# Patient Record
Sex: MALE | Race: WHITE | URBAN - METROPOLITAN AREA
[De-identification: names, ages, dates, MRNs, and addresses within clinical notes are randomized per-mention and may not be internally consistent; named-entity substitution may affect disease eponyms.]

---

## 2017-03-29 PROBLEM — S62.616A CLOSED DISPLACED FRACTURE OF PROXIMAL PHALANX OF RIGHT LITTLE FINGER, INITIAL ENCOUNTER: Status: ACTIVE | Noted: 2017-03-29

## 2017-04-25 PROBLEM — M79.641 PAIN OF RIGHT HAND: Status: ACTIVE | Noted: 2017-04-25

## 2017-05-10 PROBLEM — S62.616D CLOSED DISPLACED FRACTURE OF PROXIMAL PHALANX OF RIGHT LITTLE FINGER WITH ROUTINE HEALING, SUBSEQUENT ENCOUNTER: Status: ACTIVE | Noted: 2017-05-10

## 2017-07-20 PROCEDURE — 36415 COLL VENOUS BLD VENIPUNCTURE: CPT | Performed by: UROLOGY

## 2017-07-20 PROCEDURE — 84153 ASSAY OF PSA TOTAL: CPT | Performed by: UROLOGY

## 2018-02-27 PROBLEM — S62.616D CLOSED DISPLACED FRACTURE OF PROXIMAL PHALANX OF RIGHT LITTLE FINGER WITH ROUTINE HEALING, SUBSEQUENT ENCOUNTER: Status: RESOLVED | Noted: 2017-05-10 | Resolved: 2018-02-27

## 2018-02-27 PROBLEM — S62.616A CLOSED DISPLACED FRACTURE OF PROXIMAL PHALANX OF RIGHT LITTLE FINGER, INITIAL ENCOUNTER: Status: RESOLVED | Noted: 2017-03-29 | Resolved: 2018-02-27

## 2021-01-21 ENCOUNTER — OFFICE VISIT (OUTPATIENT)
Dept: URBAN - METROPOLITAN AREA CLINIC 27 | Facility: CLINIC | Age: 86
End: 2021-01-21
Payer: MEDICARE

## 2021-01-21 DIAGNOSIS — H40.9 UNSPECIFIED GLAUCOMA: ICD-10-CM

## 2021-01-21 DIAGNOSIS — H34.8122 CENTRAL RETINAL VEIN OCCLUSION, LEFT EYE, STABLE: Primary | ICD-10-CM

## 2021-01-21 PROCEDURE — 99204 OFFICE O/P NEW MOD 45 MIN: CPT | Performed by: OPHTHALMOLOGY

## 2021-01-21 PROCEDURE — 92134 CPTRZ OPH DX IMG PST SGM RTA: CPT | Performed by: OPHTHALMOLOGY

## 2021-01-21 ASSESSMENT — INTRAOCULAR PRESSURE
OD: 15
OS: 14

## 2021-01-21 NOTE — IMPRESSION/PLAN
Impression: Age-related nuclear cataract, bilateral: H25.13. Bilateral. Plan: --still with excellent vision
--followed by Dr. Maria Teresa Miranda

## 2021-01-21 NOTE — IMPRESSION/PLAN
Impression: Unspecified glaucoma: H40.9.  Bilateral. Plan: --cont topical therapy as directed by Dr. Lorenzo Barragan

## 2021-01-21 NOTE — IMPRESSION/PLAN
Impression: Central retinal vein occlusion, left eye, stable: K56.3543. Left. Plan: --exam/OCT reveal no e/o CME or NV OS
--findings/diagnosis d/w pt in detail --tx not indicated at this time --pt to see PCP for BP/lipid monitoring --pt instructed to call immediately with s/s decreased vision/pain RTC 3 months for OCT OU re-eval

## 2021-01-21 NOTE — IMPRESSION/PLAN
Impression: Vitreous degeneration, bilateral: H43.813.  Bilateral. Plan: --stable OU
--RDW discussed

## 2021-03-05 ENCOUNTER — OFFICE VISIT (OUTPATIENT)
Dept: URBAN - METROPOLITAN AREA CLINIC 7 | Facility: CLINIC | Age: 86
End: 2021-03-05
Payer: MEDICARE

## 2021-03-05 PROCEDURE — 99214 OFFICE O/P EST MOD 30 MIN: CPT | Performed by: OPHTHALMOLOGY

## 2021-03-05 PROCEDURE — 67028 INJECTION EYE DRUG: CPT | Performed by: OPHTHALMOLOGY

## 2021-03-05 PROCEDURE — 92134 CPTRZ OPH DX IMG PST SGM RTA: CPT | Performed by: OPHTHALMOLOGY

## 2021-03-05 ASSESSMENT — INTRAOCULAR PRESSURE
OS: 14
OD: 14

## 2021-03-05 NOTE — IMPRESSION/PLAN
Impression: Unspecified glaucoma: H40.9.  Bilateral. Plan: --cont topical therapy as directed by Dr. Adhikari All

## 2021-03-05 NOTE — IMPRESSION/PLAN
Impression: Central retinal vein occlusion of left eye w/ macular edema: H34.8120 Left. Plan: --exam/OCT reveal new-onset, severe CME d/t CRVO--findings d/w pt, rec initiation of anti-VEGF tx to maintain vision --pt understands tx may not improve VA but should reduce the risk of further visual loss --r/b/a of Avastin for CRVO discussed, pt understands Avastin is considered off-label --pt elects to initiate tx with Avastin, injection #1 performed successfully w/o complication
--will plan series of 3 consecutive monthly injections to quickly stabilize condition --pt instructed to call with immediately with s/s VA loss/pain
--control BP/lipids RTC 1 month for Avastin OS #2/3

## 2021-03-05 NOTE — IMPRESSION/PLAN
Impression: Age-related nuclear cataract, bilateral: H25.13. Bilateral. Plan: --followed by Dr. Derek Lopes

## 2021-04-15 ENCOUNTER — PROCEDURE (OUTPATIENT)
Dept: URBAN - METROPOLITAN AREA CLINIC 27 | Facility: CLINIC | Age: 86
End: 2021-04-15
Payer: MEDICARE

## 2021-04-15 PROCEDURE — 67028 INJECTION EYE DRUG: CPT | Performed by: OPHTHALMOLOGY

## 2021-04-15 ASSESSMENT — INTRAOCULAR PRESSURE
OS: 16
OD: 17

## 2021-05-20 ENCOUNTER — PROCEDURE (OUTPATIENT)
Dept: URBAN - METROPOLITAN AREA CLINIC 27 | Facility: CLINIC | Age: 86
End: 2021-05-20
Payer: MEDICARE

## 2021-05-20 DIAGNOSIS — H34.8120 CENTRAL RETINAL VEIN OCCLUSION, LEFT EYE, WITH MACULAR EDEMA: Primary | ICD-10-CM

## 2021-05-20 PROCEDURE — 67028 INJECTION EYE DRUG: CPT | Performed by: OPHTHALMOLOGY

## 2021-05-20 ASSESSMENT — INTRAOCULAR PRESSURE
OD: 13
OS: 13

## 2021-06-17 ENCOUNTER — OFFICE VISIT (OUTPATIENT)
Dept: URBAN - METROPOLITAN AREA CLINIC 27 | Facility: CLINIC | Age: 86
End: 2021-06-17
Payer: MEDICARE

## 2021-06-17 PROCEDURE — 99213 OFFICE O/P EST LOW 20 MIN: CPT | Performed by: OPHTHALMOLOGY

## 2021-06-17 PROCEDURE — 92134 CPTRZ OPH DX IMG PST SGM RTA: CPT | Performed by: OPHTHALMOLOGY

## 2021-06-17 PROCEDURE — 67028 INJECTION EYE DRUG: CPT | Performed by: OPHTHALMOLOGY

## 2021-06-17 ASSESSMENT — INTRAOCULAR PRESSURE
OS: 12
OD: 13

## 2021-06-17 NOTE — IMPRESSION/PLAN
Impression: Unspecified glaucoma: H40.9.  Bilateral. Plan: --cont topical therapy as directed by Dr. Ryan Layton

## 2021-06-17 NOTE — IMPRESSION/PLAN
Impression: Central retinal vein occlusion of left eye w/ macular edema: H34.8120 Left. s/p Avastin x 3, last 5/20/21 Plan: --exam/OCT reveal improving CME d/t CRVO, s/p Avastin x 3
--recommend continuing anti-VEGF with gradual extension of interval
--r/b/a of Avastin for CRVO discussed --pt understands Avastin is considered off-label --pt elects to continue tx with Avastin
--injection #4 performed successfully w/o complication --pt instructed to call with immediately with s/s VA loss/pain
--control BP/lipids RTC 8 weeks OCT OU reeval Avastin

## 2021-06-17 NOTE — IMPRESSION/PLAN
Impression: Age-related nuclear cataract, bilateral: H25.13. Bilateral. Plan: --followed by Dr. Rafia Hamilton

## 2021-09-21 ENCOUNTER — OFFICE VISIT (OUTPATIENT)
Dept: URBAN - METROPOLITAN AREA CLINIC 27 | Facility: CLINIC | Age: 86
End: 2021-09-21
Payer: MEDICARE

## 2021-09-21 DIAGNOSIS — H43.813 VITREOUS DEGENERATION, BILATERAL: ICD-10-CM

## 2021-09-21 DIAGNOSIS — H25.13 AGE-RELATED NUCLEAR CATARACT, BILATERAL: ICD-10-CM

## 2021-09-21 PROCEDURE — 67028 INJECTION EYE DRUG: CPT | Performed by: OPHTHALMOLOGY

## 2021-09-21 PROCEDURE — 92134 CPTRZ OPH DX IMG PST SGM RTA: CPT | Performed by: OPHTHALMOLOGY

## 2021-09-21 PROCEDURE — 99214 OFFICE O/P EST MOD 30 MIN: CPT | Performed by: OPHTHALMOLOGY

## 2021-09-21 ASSESSMENT — INTRAOCULAR PRESSURE
OD: 13
OS: 11

## 2021-09-21 NOTE — IMPRESSION/PLAN
Impression: Central retinal vein occlusion of left eye w/ macular edema: H34.8120 Left. 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
--exam/OCT reveal recurrent CME d/t CRVO
--recommend restarting anti-VEGF with greater frequency
--r/b/a of Avastin for CRVO discussed --pt understands Avastin is considered off-label --pt elects to continue tx with Avastin
--injection #5 performed successfully w/o complication --pt instructed to call with immediately with s/s VA loss/pain
--control BP/lipids RTC 6 weeks for OCT OU re-eval

## 2021-09-21 NOTE — IMPRESSION/PLAN
Impression: Age-related nuclear cataract, bilateral: H25.13. Bilateral. Plan: --followed by Dr. Fabian Loera

## 2021-09-21 NOTE — IMPRESSION/PLAN
Impression: Unspecified glaucoma: H40.9.  Bilateral. Plan: --cont topical therapy as directed by Dr. Radha Beltran

## 2021-11-02 ENCOUNTER — OFFICE VISIT (OUTPATIENT)
Dept: URBAN - METROPOLITAN AREA CLINIC 27 | Facility: CLINIC | Age: 86
End: 2021-11-02
Payer: MEDICARE

## 2021-11-02 PROCEDURE — 92134 CPTRZ OPH DX IMG PST SGM RTA: CPT | Performed by: OPHTHALMOLOGY

## 2021-11-02 PROCEDURE — 67028 INJECTION EYE DRUG: CPT | Performed by: OPHTHALMOLOGY

## 2021-11-02 ASSESSMENT — INTRAOCULAR PRESSURE
OD: 15
OS: 19

## 2021-11-02 NOTE — IMPRESSION/PLAN
Impression: Central retinal vein occlusion of left eye w/ macular edema: H34.8120 Left. s/p Avastin x 4, last 09/21/21
last full DFE OU 9/21/21 Plan: --exam/OCT reveal improving CME d/t CRVO
--recommend continue anti-VEGF q6-7 weeks
--r/b/a of Avastin for CRVO discussed --pt understands Avastin is considered off-label --pt elects to continue tx with Avastin
--injection #5 performed successfully w/o complication --pt instructed to call with immediately with s/s VA loss/pain
--control BP/lipids RTC 6-7 weeks for OCT OU re-eval

## 2021-11-02 NOTE — IMPRESSION/PLAN
Impression: Unspecified glaucoma: H40.9.  Bilateral. Plan: --cont topical therapy as directed by Dr. Micah Hines

## 2021-12-14 ENCOUNTER — OFFICE VISIT (OUTPATIENT)
Dept: URBAN - METROPOLITAN AREA CLINIC 27 | Facility: CLINIC | Age: 86
End: 2021-12-14
Payer: MEDICARE

## 2021-12-14 PROCEDURE — 67028 INJECTION EYE DRUG: CPT | Performed by: OPHTHALMOLOGY

## 2021-12-14 PROCEDURE — 92134 CPTRZ OPH DX IMG PST SGM RTA: CPT | Performed by: OPHTHALMOLOGY

## 2021-12-14 ASSESSMENT — INTRAOCULAR PRESSURE
OS: 13
OD: 16

## 2021-12-14 NOTE — IMPRESSION/PLAN
Impression: Age-related nuclear cataract, bilateral: H25.13. Bilateral. Plan: --followed by Dr. Funmi Munoz

## 2021-12-14 NOTE — IMPRESSION/PLAN
Impression: Central retinal vein occlusion of left eye w/ macular edema: H34.8120 Left. s/p Avastin x 5, last 11/02/21
last full DFE OU 9/21/21 Plan: --exam/OCT reveal improving CME d/t CRVO
--recommend continue anti-VEGF, extend tx interval slowly
--r/b/a of Avastin for CRVO discussed --pt understands Avastin is considered off-label --pt elects to continue tx with Avastin
--injection #6 performed successfully w/o complication --pt instructed to call with immediately with s/s VA loss/pain
--control BP/lipids RTC 8 weeks for OCT OU re-eval

## 2021-12-14 NOTE — IMPRESSION/PLAN
Impression: Unspecified glaucoma: H40.9.  Bilateral. Plan: --cont topical therapy as directed by Dr. Bruce Bolden

## 2022-02-08 ENCOUNTER — OFFICE VISIT (OUTPATIENT)
Dept: URBAN - METROPOLITAN AREA CLINIC 27 | Facility: CLINIC | Age: 87
End: 2022-02-08
Payer: MEDICARE

## 2022-02-08 PROCEDURE — 92134 CPTRZ OPH DX IMG PST SGM RTA: CPT | Performed by: OPHTHALMOLOGY

## 2022-02-08 PROCEDURE — 67028 INJECTION EYE DRUG: CPT | Performed by: OPHTHALMOLOGY

## 2022-02-08 ASSESSMENT — INTRAOCULAR PRESSURE
OD: 15
OS: 15

## 2022-02-08 NOTE — IMPRESSION/PLAN
Impression: Central retinal vein occlusion of left eye w/ macular edema: H34.8120 Left. s/p Avastin x 6, last 12/14/21
last full DFE OU 9/21/21 Plan: --exam/OCT reveal resolving CME OS
--recommend anti-VEGF, cont treat and extend --r/b/a of Avastin for CRVO discussed --pt understands Avastin is considered off-label --pt elects to continue tx with Avastin
--injection #7 performed successfully w/o complication --pt instructed to call with immediately with s/s VA loss/pain
--control BP/lipids RTC 10 weeks for OCT OU re-eval, DFE OU (semiannual)

## 2022-04-19 ENCOUNTER — OFFICE VISIT (OUTPATIENT)
Dept: URBAN - METROPOLITAN AREA CLINIC 27 | Facility: CLINIC | Age: 87
End: 2022-04-19
Payer: MEDICARE

## 2022-04-19 DIAGNOSIS — H43.813 VITREOUS DEGENERATION, BILATERAL: ICD-10-CM

## 2022-04-19 DIAGNOSIS — H25.13 AGE-RELATED NUCLEAR CATARACT, BILATERAL: ICD-10-CM

## 2022-04-19 DIAGNOSIS — H34.8120 CENTRAL RETINAL VEIN OCCLUSION, LEFT EYE, WITH MACULAR EDEMA: Primary | ICD-10-CM

## 2022-04-19 DIAGNOSIS — H40.9 UNSPECIFIED GLAUCOMA: ICD-10-CM

## 2022-04-19 PROCEDURE — 67028 INJECTION EYE DRUG: CPT | Performed by: OPHTHALMOLOGY

## 2022-04-19 PROCEDURE — 92134 CPTRZ OPH DX IMG PST SGM RTA: CPT | Performed by: OPHTHALMOLOGY

## 2022-04-19 PROCEDURE — 92014 COMPRE OPH EXAM EST PT 1/>: CPT | Performed by: OPHTHALMOLOGY

## 2022-04-19 ASSESSMENT — INTRAOCULAR PRESSURE
OD: 10
OS: 12

## 2022-04-19 NOTE — IMPRESSION/PLAN
Impression: Central retinal vein occlusion of left eye w/ macular edema: H34.8120 Left. s/p Avastin x 7, last 2/8/22
last full DFE OU 9/21/21 Plan: --both eyes are due for full dilated exam today
--exam/OCT reveal stable, mild CME OS
--recommend anti-VEGF, cont treat and extend --r/b/a of Avastin for CRVO discussed --pt understands Avastin is considered off-label --pt elects to continue tx with Avastin
--injection #8 performed successfully w/o complication --pt instructed to call with immediately with s/s VA loss/pain
--control BP/lipids RTC 12 weeks for OCT OU re-eval, DFE OU (semiannual)

## 2022-04-19 NOTE — IMPRESSION/PLAN
Impression: Age-related nuclear cataract, bilateral: H25.13. Bilateral. Plan: --followed by Dr. Kleber Rocha

## 2022-08-11 ENCOUNTER — OFFICE VISIT (OUTPATIENT)
Dept: URBAN - METROPOLITAN AREA CLINIC 27 | Facility: CLINIC | Age: 87
End: 2022-08-11
Payer: MEDICARE

## 2022-08-11 DIAGNOSIS — H43.813 VITREOUS DEGENERATION, BILATERAL: ICD-10-CM

## 2022-08-11 DIAGNOSIS — H25.13 AGE-RELATED NUCLEAR CATARACT, BILATERAL: ICD-10-CM

## 2022-08-11 DIAGNOSIS — H34.8120 CENTRAL RETINAL VEIN OCCLUSION, LEFT EYE, WITH MACULAR EDEMA: Primary | ICD-10-CM

## 2022-08-11 DIAGNOSIS — H40.9 UNSPECIFIED GLAUCOMA: ICD-10-CM

## 2022-08-11 PROCEDURE — 67028 INJECTION EYE DRUG: CPT | Performed by: OPHTHALMOLOGY

## 2022-08-11 PROCEDURE — 99214 OFFICE O/P EST MOD 30 MIN: CPT | Performed by: OPHTHALMOLOGY

## 2022-08-11 PROCEDURE — 92134 CPTRZ OPH DX IMG PST SGM RTA: CPT | Performed by: OPHTHALMOLOGY

## 2022-08-11 ASSESSMENT — INTRAOCULAR PRESSURE
OS: 14
OD: 12

## 2022-08-11 NOTE — IMPRESSION/PLAN
Impression: Central retinal vein occlusion of left eye w/ macular edema: H34.8120 Left. s/p Avastin x 8, last 4/19/22
last full DFE OU 04/19/22 Plan: --exam/OCT reveal worsening CME OS
--recommend anti-VEGF OS, increase tx frequency
--r/b/a of Avastin for CRVO discussed --pt understands Avastin is considered off-label --pt elects to continue tx with Avastin
--injection #9 performed successfully w/o complication --pt instructed to call with immediately with s/s VA loss/pain
--control BP/lipids RTC 10 weeks for OCT OU re-eval, DFE OU

## 2022-08-11 NOTE — IMPRESSION/PLAN
Impression: Unspecified glaucoma: H40.9.  Bilateral. Plan: --cont topical therapy as directed by Dr. Rosalinda Mosley

## 2022-08-11 NOTE — IMPRESSION/PLAN
Impression: Age-related nuclear cataract, bilateral: H25.13. Bilateral. Plan: --followed by Dr. Shannen Suarez

## 2022-10-20 ENCOUNTER — OFFICE VISIT (OUTPATIENT)
Dept: URBAN - METROPOLITAN AREA CLINIC 27 | Facility: CLINIC | Age: 87
End: 2022-10-20
Payer: MEDICARE

## 2022-10-20 DIAGNOSIS — H34.8120 CENTRAL RETINAL VEIN OCCLUSION, LEFT EYE, WITH MACULAR EDEMA: Primary | ICD-10-CM

## 2022-10-20 DIAGNOSIS — H25.13 AGE-RELATED NUCLEAR CATARACT, BILATERAL: ICD-10-CM

## 2022-10-20 DIAGNOSIS — H43.813 VITREOUS DEGENERATION, BILATERAL: ICD-10-CM

## 2022-10-20 DIAGNOSIS — H40.9 UNSPECIFIED GLAUCOMA: ICD-10-CM

## 2022-10-20 PROCEDURE — 92134 CPTRZ OPH DX IMG PST SGM RTA: CPT | Performed by: OPHTHALMOLOGY

## 2022-10-20 PROCEDURE — 67028 INJECTION EYE DRUG: CPT | Performed by: OPHTHALMOLOGY

## 2022-10-20 ASSESSMENT — INTRAOCULAR PRESSURE
OS: 16
OD: 15

## 2022-10-20 NOTE — IMPRESSION/PLAN
Impression: Central retinal vein occlusion of left eye w/ macular edema: H34.8120 Left. s/p Avastin x 9, last 08/11/22 Plan: --OCT: improving CME OS @ 10 weeks s/p Avastin
--recommend anti-VEGF OS, maintain q10w tx frequency
--r/b/a of Avastin for CRVO discussed --pt understands Avastin is considered off-label --pt elects to continue tx with Avastin
--injection #10 performed successfully w/o complication --pt instructed to call with immediately with s/s VA loss/pain
--control BP/lipids RTC 10 weeks for OCT OU Avastin OS 3/3

## 2022-10-20 NOTE — IMPRESSION/PLAN
Impression: Unspecified glaucoma: H40.9.  Bilateral. Plan: --cont topical therapy as directed by Dr. Derek Lopes

## 2022-12-29 ENCOUNTER — PROCEDURE (OUTPATIENT)
Dept: URBAN - METROPOLITAN AREA CLINIC 27 | Facility: CLINIC | Age: 87
End: 2022-12-29
Payer: MEDICARE

## 2022-12-29 DIAGNOSIS — H34.8120 CENTRAL RETINAL VEIN OCCLUSION, LEFT EYE, WITH MACULAR EDEMA: Primary | ICD-10-CM

## 2022-12-29 PROCEDURE — 67028 INJECTION EYE DRUG: CPT | Performed by: OPHTHALMOLOGY

## 2022-12-29 PROCEDURE — 92134 CPTRZ OPH DX IMG PST SGM RTA: CPT | Performed by: OPHTHALMOLOGY

## 2022-12-29 ASSESSMENT — INTRAOCULAR PRESSURE
OS: 17
OD: 17

## 2022-12-29 NOTE — IMPRESSION/PLAN
Impression: Central retinal vein occlusion of left eye w/ macular edema: H34.8120 Left. s/p Avastin x 10, last 10/20/22 Plan: --OCT: persistent CME OS, slightly worse @ 10 weeks s/p Avastin
--recommend anti-VEGF OS, maintain q10w tx frequency
--r/b/a of Avastin for CRVO discussed --pt understands Avastin is considered off-label --pt elects to continue tx with Avastin
--injection #11 performed successfully w/o complication --pt instructed to call with immediately with s/s VA loss/pain
--control BP/lipids RTC 10 weeks for OCT OU Re-Eval Avastin OS

## 2023-03-09 ENCOUNTER — OFFICE VISIT (OUTPATIENT)
Dept: URBAN - METROPOLITAN AREA CLINIC 27 | Facility: CLINIC | Age: 88
End: 2023-03-09
Payer: MEDICARE

## 2023-03-09 DIAGNOSIS — H34.8120 CENTRAL RETINAL VEIN OCCLUSION, LEFT EYE, WITH MACULAR EDEMA: Primary | ICD-10-CM

## 2023-03-09 DIAGNOSIS — H40.9 UNSPECIFIED GLAUCOMA: ICD-10-CM

## 2023-03-09 DIAGNOSIS — H25.13 AGE-RELATED NUCLEAR CATARACT, BILATERAL: ICD-10-CM

## 2023-03-09 DIAGNOSIS — H43.813 VITREOUS DEGENERATION, BILATERAL: ICD-10-CM

## 2023-03-09 PROCEDURE — 67028 INJECTION EYE DRUG: CPT | Performed by: OPHTHALMOLOGY

## 2023-03-09 PROCEDURE — 92134 CPTRZ OPH DX IMG PST SGM RTA: CPT | Performed by: OPHTHALMOLOGY

## 2023-03-09 PROCEDURE — 92014 COMPRE OPH EXAM EST PT 1/>: CPT | Performed by: OPHTHALMOLOGY

## 2023-03-09 ASSESSMENT — INTRAOCULAR PRESSURE
OD: 17
OS: 17

## 2023-03-09 NOTE — IMPRESSION/PLAN
Impression: Age-related nuclear cataract, bilateral: H25.13. Bilateral. Plan: --followed by Dr. Maria Teresa Miranda

## 2023-03-09 NOTE — IMPRESSION/PLAN
Impression: Central retinal vein occlusion of left eye w/ macular edema: H34.8120 Left. s/p Avastin x 11, last 12/29/22 Plan: --Exam and OCT show stable CME OS @ 10 weeks s/p Avastin
--recommend anti-VEGF OS, extend to q12 weeks
--r/b/a of Avastin for CRVO discussed --pt understands Avastin is considered off-label --pt elects to continue tx with Avastin
--injection #12 performed successfully w/o complication --pt instructed to call with immediately with s/s VA loss/pain
--control BP/lipids RTC 12 weeks for OCT OU Re-Eval Avastin OS

## 2023-03-09 NOTE — IMPRESSION/PLAN
Impression: Unspecified glaucoma: H40.9.  Bilateral. Plan: --cont topical therapy as directed by Dr. Maria Teresa Miranda

## 2023-06-01 ENCOUNTER — OFFICE VISIT (OUTPATIENT)
Dept: URBAN - METROPOLITAN AREA CLINIC 27 | Facility: CLINIC | Age: 88
End: 2023-06-01
Payer: COMMERCIAL

## 2023-06-01 DIAGNOSIS — H34.8120 CENTRAL RETINAL VEIN OCCLUSION, LEFT EYE, WITH MACULAR EDEMA: Primary | ICD-10-CM

## 2023-06-01 DIAGNOSIS — H43.813 VITREOUS DEGENERATION, BILATERAL: ICD-10-CM

## 2023-06-01 DIAGNOSIS — H25.13 AGE-RELATED NUCLEAR CATARACT, BILATERAL: ICD-10-CM

## 2023-06-01 DIAGNOSIS — H40.9 UNSPECIFIED GLAUCOMA: ICD-10-CM

## 2023-06-01 PROCEDURE — 92014 COMPRE OPH EXAM EST PT 1/>: CPT | Performed by: OPHTHALMOLOGY

## 2023-06-01 PROCEDURE — 92134 CPTRZ OPH DX IMG PST SGM RTA: CPT | Performed by: OPHTHALMOLOGY

## 2023-06-01 PROCEDURE — 67028 INJECTION EYE DRUG: CPT | Performed by: OPHTHALMOLOGY

## 2023-06-01 RX ORDER — PROPYLENE GLYCOL 0.06 MG/ML
0.6 % EMULSION OPHTHALMIC
Qty: 5 | Status: ACTIVE
Start: 2023-06-01

## 2023-06-01 ASSESSMENT — INTRAOCULAR PRESSURE
OS: 12
OD: 12

## 2023-06-01 NOTE — IMPRESSION/PLAN
Impression: Age-related nuclear cataract, bilateral: H25.13. Bilateral. Plan: --followed by Dr. Rosalinda Mosley

## 2023-06-01 NOTE — IMPRESSION/PLAN
Impression: Unspecified glaucoma: H40.9.  Bilateral. Plan: --cont topical therapy as directed by Dr. Shannen Suarez

## 2023-06-01 NOTE — IMPRESSION/PLAN
Impression: Central retinal vein occlusion of left eye w/ macular edema: H34.8120 Left. s/p Avastin x 12, last 03/09/23 Plan: --Exam and OCT show worsening CME OS @ 12 weeks s/p Avastin
--recommend anti-VEGF OS, increase frequency to q10 weeks
--r/b/a of Avastin for CRVO discussed --pt understands Avastin is considered off-label --pt elects to continue tx with Avastin
--injection #13 performed successfully w/o complication --pt instructed to call with immediately with s/s VA loss/pain
--control BP/lipids RTC 10 weeks for OCT OU Avastin OS 2/3

## 2023-08-10 ENCOUNTER — PROCEDURE (OUTPATIENT)
Dept: URBAN - METROPOLITAN AREA CLINIC 27 | Facility: CLINIC | Age: 88
End: 2023-08-10
Payer: MEDICARE

## 2023-08-10 DIAGNOSIS — H34.8120 CENTRAL RETINAL VEIN OCCLUSION, LEFT EYE, WITH MACULAR EDEMA: Primary | ICD-10-CM

## 2023-08-10 DIAGNOSIS — H43.813 VITREOUS DEGENERATION, BILATERAL: ICD-10-CM

## 2023-08-10 DIAGNOSIS — H25.13 AGE-RELATED NUCLEAR CATARACT, BILATERAL: ICD-10-CM

## 2023-08-10 DIAGNOSIS — H40.9 UNSPECIFIED GLAUCOMA: ICD-10-CM

## 2023-08-10 PROCEDURE — 92134 CPTRZ OPH DX IMG PST SGM RTA: CPT | Performed by: OPHTHALMOLOGY

## 2023-08-10 PROCEDURE — 67028 INJECTION EYE DRUG: CPT | Performed by: OPHTHALMOLOGY

## 2023-08-10 ASSESSMENT — INTRAOCULAR PRESSURE
OS: 16
OD: 15